# Patient Record
Sex: FEMALE | Race: BLACK OR AFRICAN AMERICAN | Employment: STUDENT | URBAN - METROPOLITAN AREA
[De-identification: names, ages, dates, MRNs, and addresses within clinical notes are randomized per-mention and may not be internally consistent; named-entity substitution may affect disease eponyms.]

---

## 2019-11-30 ENCOUNTER — OFFICE VISIT (OUTPATIENT)
Dept: URGENT CARE | Facility: URGENT CARE | Age: 19
End: 2019-11-30
Payer: COMMERCIAL

## 2019-11-30 VITALS
RESPIRATION RATE: 16 BRPM | HEART RATE: 89 BPM | OXYGEN SATURATION: 98 % | SYSTOLIC BLOOD PRESSURE: 112 MMHG | TEMPERATURE: 98.2 F | WEIGHT: 276 LBS | DIASTOLIC BLOOD PRESSURE: 64 MMHG

## 2019-11-30 DIAGNOSIS — J02.9 SORE THROAT: ICD-10-CM

## 2019-11-30 DIAGNOSIS — H65.91 OME (OTITIS MEDIA WITH EFFUSION), RIGHT: Primary | ICD-10-CM

## 2019-11-30 DIAGNOSIS — H10.9 CONJUNCTIVITIS OF BOTH EYES, UNSPECIFIED CONJUNCTIVITIS TYPE: ICD-10-CM

## 2019-11-30 LAB
DEPRECATED S PYO AG THROAT QL EIA: NORMAL
SPECIMEN SOURCE: NORMAL

## 2019-11-30 PROCEDURE — 99203 OFFICE O/P NEW LOW 30 MIN: CPT | Performed by: PHYSICIAN ASSISTANT

## 2019-11-30 PROCEDURE — 87880 STREP A ASSAY W/OPTIC: CPT | Performed by: PHYSICIAN ASSISTANT

## 2019-11-30 PROCEDURE — 87081 CULTURE SCREEN ONLY: CPT | Performed by: PHYSICIAN ASSISTANT

## 2019-11-30 RX ORDER — AMOXICILLIN 875 MG
875 TABLET ORAL 2 TIMES DAILY
Qty: 20 TABLET | Refills: 0 | Status: SHIPPED | OUTPATIENT
Start: 2019-11-30 | End: 2019-12-10

## 2019-11-30 RX ORDER — CITALOPRAM HYDROBROMIDE 20 MG/1
20 TABLET ORAL DAILY
COMMUNITY

## 2019-11-30 RX ORDER — OXCARBAZEPINE 300 MG/1
300 TABLET, FILM COATED ORAL 2 TIMES DAILY
COMMUNITY

## 2019-11-30 RX ORDER — GENTAMICIN SULFATE 3 MG/ML
2 SOLUTION/ DROPS OPHTHALMIC EVERY 4 HOURS
Qty: 5 ML | Refills: 0 | Status: SHIPPED | OUTPATIENT
Start: 2019-11-30

## 2019-11-30 NOTE — PROGRESS NOTES
SUBJECTIVE:  Bill Denney is a 19 year old female with a chief complaint of sore throat, ear pain and pressure that getting worse, eyes red and mattering.  .  Onset of symptoms was 3 day(s) ago for worsening sx but has been sick for longer.    Course of illness: gradual onset, still present and worsening.  Severity moderate  Current and Associated symptoms: mild cough but no SOB or chest pain and no GI sx, rashes or HA.    Treatment measures tried include Tylenol/Ibuprofen, Fluids and Rest.  Predisposing factors include None.    PMH hx of ear issues and strep      Current Outpatient Medications   Medication Sig Dispense Refill     amoxicillin (AMOXIL) 875 MG tablet Take 1 tablet (875 mg) by mouth 2 times daily for 10 days 20 tablet 0     citalopram (CELEXA) 20 MG tablet Take 20 mg by mouth daily       gentamicin (GARAMYCIN) 0.3 % ophthalmic solution Place 2 drops into both eyes every 4 hours 5 mL 0     OXcarbazepine (TRILEPTAL) 300 MG tablet Take 300 mg by mouth 2 times daily       Social History     Tobacco Use     Smoking status: Current Every Day Smoker     Smokeless tobacco: Never Used     Tobacco comment: pt vapes once every 2 hours--   Substance Use Topics     Alcohol use: Not on file       ROS:  Review of systems negative except as stated above.    OBJECTIVE:   /64 (Cuff Size: Adult Large)   Pulse 89   Temp 98.2  F (36.8  C) (Oral)   Resp 16   Wt 125.2 kg (276 lb)   SpO2 98%   GENERAL APPEARANCE: healthy, alert and no distress  EYES: EOMI,  PERRL, conjunctiva red and mattering noted.  No cellulitis noted  HENT: TM erythematous right, TM congested/bulging right and oral mucous membranes moist, no erythema noted  NECK: supple, non-tender to palpation, no adenopathy noted  RESP: lungs clear to auscultation - no rales, rhonchi or wheezes  CV: regular rates and rhythm, normal S1 S2, no murmur noted  SKIN: no suspicious lesions or rashes    Rapid Strep test is negative; await throat culture  results.    ASSESSMENT:      Sore throat  OME (otitis media with effusion), right  Conjunctivitis of both eyes, unspecified conjunctivitis type    PLAN:   Med as directed and hygiene reviewed.    Symptomatic treat with gargles, lozenges, and OTC analgesic as needed. Follow-up with primary clinic if not improving.  Culture pending for strep

## 2019-12-01 LAB
BACTERIA SPEC CULT: NORMAL
SPECIMEN SOURCE: NORMAL

## 2020-03-11 ENCOUNTER — HEALTH MAINTENANCE LETTER (OUTPATIENT)
Age: 20
End: 2020-03-11

## 2021-01-04 ENCOUNTER — HEALTH MAINTENANCE LETTER (OUTPATIENT)
Age: 21
End: 2021-01-04

## 2021-04-25 ENCOUNTER — HEALTH MAINTENANCE LETTER (OUTPATIENT)
Age: 21
End: 2021-04-25

## 2021-10-10 ENCOUNTER — HEALTH MAINTENANCE LETTER (OUTPATIENT)
Age: 21
End: 2021-10-10

## 2022-05-22 ENCOUNTER — HEALTH MAINTENANCE LETTER (OUTPATIENT)
Age: 22
End: 2022-05-22

## 2022-09-18 ENCOUNTER — HEALTH MAINTENANCE LETTER (OUTPATIENT)
Age: 22
End: 2022-09-18

## 2023-06-02 ENCOUNTER — TELEPHONE (OUTPATIENT)
Dept: PLASTIC SURGERY | Facility: CLINIC | Age: 23
End: 2023-06-02
Payer: COMMERCIAL

## 2023-06-02 NOTE — CONFIDENTIAL NOTE
Pt LVM on writer's phone requesting top surgery consult with Dr. Hinds. Writer called back and LVM. Also sent [email or web address].

## 2023-06-04 ENCOUNTER — HEALTH MAINTENANCE LETTER (OUTPATIENT)
Age: 23
End: 2023-06-04